# Patient Record
Sex: MALE | Race: WHITE | NOT HISPANIC OR LATINO | URBAN - METROPOLITAN AREA
[De-identification: names, ages, dates, MRNs, and addresses within clinical notes are randomized per-mention and may not be internally consistent; named-entity substitution may affect disease eponyms.]

---

## 2018-06-11 ENCOUNTER — EMERGENCY (EMERGENCY)
Facility: HOSPITAL | Age: 28
LOS: 1 days | Discharge: HOME | End: 2018-06-11
Attending: EMERGENCY MEDICINE
Payer: COMMERCIAL

## 2018-06-11 VITALS
DIASTOLIC BLOOD PRESSURE: 71 MMHG | RESPIRATION RATE: 16 BRPM | HEART RATE: 88 BPM | WEIGHT: 229.94 LBS | OXYGEN SATURATION: 100 % | TEMPERATURE: 98 F | SYSTOLIC BLOOD PRESSURE: 129 MMHG

## 2018-06-11 PROCEDURE — 99283 EMERGENCY DEPT VISIT LOW MDM: CPT

## 2018-06-11 RX ORDER — POLYMYXIN B SULF/TRIMETHOPRIM 10000-1/ML
1 DROPS OPHTHALMIC (EYE) ONCE
Qty: 0 | Refills: 0 | Status: COMPLETED | OUTPATIENT
Start: 2018-06-11 | End: 2018-06-11

## 2018-06-11 RX ORDER — POLYMYXIN B SULF/TRIMETHOPRIM 10000-1/ML
1 DROPS OPHTHALMIC (EYE)
Qty: 1 | Refills: 0 | OUTPATIENT
Start: 2018-06-11 | End: 2018-06-15

## 2018-06-11 RX ADMIN — Medication 1 DROP(S): at 23:36

## 2018-06-11 NOTE — ED PROVIDER NOTE - ATTENDING CONTRIBUTION TO CARE
28y M no signif PMH here with c/o R eye pain, redness beginning today after playing golf outdoors all day. States +FB sensation and burning pain, sensitivity to light, frequent tearing. Denies vision change.   Gen: WNWD NAD  HEENT: NCAT PERRL EOMI R eye conjunctival injection and tearing, no foreign body appreciated on lid eversion, no purulent drainage  Neuro: A&Ox3, CN grossly intact, sensation intact, moves all extremities   AP: 28y M no signif PMH here with c/o R eye pain, redness. Throughly inspected for FB with tetracaine anesthesia, however system wide shortage of fluorescein and unable to stain for corneal abrasion. Will tx as corneal abrasion given sx. Advise FU with optho in next 24-48 hours.

## 2018-06-11 NOTE — ED PROVIDER NOTE - MEDICAL DECISION MAKING DETAILS
Right eye FB sensation, improved after tetracaine drop. unable to do fluorescein stain. Will treat for corneal abrasion and d/c home with opthalmology follow up. Right eye FB sensation, improved after tetracaine drop. unable to do fluorescein stain due to system wide shortage. Will treat for corneal abrasion and d/c home with opthalmology follow up.  Kerri: See attending statement below

## 2018-06-11 NOTE — ED ADULT NURSE NOTE - OBJECTIVE STATEMENT
27y/o male walked into ED a&ox3 c/o eye redness. States redness started after golfing today, felt like something got into his right eye. Has some pain to right eye. Denies itching, blurry or double vision, HA or any other symptoms. Right eye appears red, no drainage. MD at bedside.

## 2018-06-11 NOTE — ED PROVIDER NOTE - OBJECTIVE STATEMENT
28 YOM p/w eye redness after golfing pt states he felt like something got into his right eye occurred 6 hours ago. Pt endorses redness, mild pain, no blurry vision or vision changes. Pt denies chest pain, denies facial pain.

## 2018-06-11 NOTE — ED PROVIDER NOTE - EYES, MLM
Clear bilaterally, pupils equal, round and reactive to light. EOMI. slight injection of right eye. No cells or flare in right eye on slit lamp. due to fluorescein shortage unable to stain.

## 2022-05-13 NOTE — ED ADULT NURSE NOTE - NS ED NURSE RECORD ANOTHER HT AND WT
Writer called and spoke with patients mother Fatuma in regard to patients test results.  Patient's mother verbalized understanding.  No further questions at this time.   Yes

## 2022-11-25 ENCOUNTER — EMERGENCY (EMERGENCY)
Facility: HOSPITAL | Age: 32
LOS: 1 days | Discharge: HOME | End: 2022-11-25
Attending: EMERGENCY MEDICINE | Admitting: EMERGENCY MEDICINE
Payer: COMMERCIAL

## 2022-11-25 VITALS
SYSTOLIC BLOOD PRESSURE: 130 MMHG | WEIGHT: 250 LBS | TEMPERATURE: 99 F | DIASTOLIC BLOOD PRESSURE: 88 MMHG | HEIGHT: 72 IN | RESPIRATION RATE: 16 BRPM | OXYGEN SATURATION: 97 % | HEART RATE: 92 BPM

## 2022-11-25 PROCEDURE — 29505 APPLICATION LONG LEG SPLINT: CPT

## 2022-11-25 PROCEDURE — 99283 EMERGENCY DEPT VISIT LOW MDM: CPT | Mod: 25

## 2022-11-25 PROCEDURE — 73610 X-RAY EXAM OF ANKLE: CPT | Mod: 26,RT

## 2022-11-25 PROCEDURE — 73610 X-RAY EXAM OF ANKLE: CPT

## 2022-11-25 PROCEDURE — 29515 APPLICATION SHORT LEG SPLINT: CPT | Mod: RT

## 2022-11-25 RX ORDER — IBUPROFEN 200 MG
600 TABLET ORAL ONCE
Refills: 0 | Status: COMPLETED | OUTPATIENT
Start: 2022-11-25 | End: 2022-11-25

## 2022-11-25 RX ADMIN — Medication 600 MILLIGRAM(S): at 14:25

## 2022-11-25 NOTE — ED PROVIDER NOTE - PATIENT PORTAL LINK FT
You can access the FollowMyHealth Patient Portal offered by [facility] by registering at the following website: [email or web address]. By joining [employer]’s FollowMyHealth portal, you will also be able to view your health information using other applications (apps) compatible with our system.

## 2022-11-25 NOTE — ED ADULT NURSE NOTE - OBJECTIVE STATEMENT
" I rolled my right ankle playing soccer today, a lot of pain "    patient denies loc, injured the same ankle long time ago, pulse +, skin is warm to touch and intact., will continue to monitor.

## 2022-11-25 NOTE — ED PROVIDER NOTE - CLINICAL SUMMARY MEDICAL DECISION MAKING FREE TEXT BOX
Patient complaining of right ankle pain and swelling status post twisted while playing soccer.  Patient fell to ground but denies any other injury.  No head injury LOC headache neck pain back pain arm pain chest pain short of breath abdominal pain weakness numbness or any other injury.  Plan x-ray Motrin differential including but not limited to fracture sprain

## 2022-11-25 NOTE — ED PROVIDER NOTE - CARE PROVIDER_API CALL
Bhargav Walsh)  Orthopaedic Surgery  [address] [place] [address]  Phone: [phone]  Fax: [phone]  Follow Up Time: 1-3 Days

## 2024-06-12 NOTE — ED PROVIDER NOTE - PRO INTERPRETER NEED 2
Arrives to [facility] for the evaluation of sinus congestion and pressure, productive cough, chest congestion and wheezing.  Symptoms started 1 week ago and thought was allergies.  Has been taking OTC allergy meds and then started to take Mucinex.  Afebrile.  Respirations unlabored.  Reports coughing up thick, greenish/brown phlegm.  Not actively coughing at this time.  Waiting provider to assess.    English

## 2024-11-07 ENCOUNTER — APPOINTMENT (OUTPATIENT)
Dept: PULMONOLOGY | Facility: CLINIC | Age: 34
End: 2024-11-07
Payer: COMMERCIAL

## 2024-11-07 VITALS
HEIGHT: 73 IN | OXYGEN SATURATION: 97 % | SYSTOLIC BLOOD PRESSURE: 117 MMHG | DIASTOLIC BLOOD PRESSURE: 74 MMHG | BODY MASS INDEX: 38.04 KG/M2 | TEMPERATURE: 96 F | WEIGHT: 287 LBS | HEART RATE: 98 BPM

## 2024-11-07 DIAGNOSIS — E66.812 OBESITY, CLASS 2: ICD-10-CM

## 2024-11-07 DIAGNOSIS — G47.33 OBSTRUCTIVE SLEEP APNEA (ADULT) (PEDIATRIC): ICD-10-CM

## 2024-11-07 PROCEDURE — 99204 OFFICE O/P NEW MOD 45 MIN: CPT

## 2024-11-09 ENCOUNTER — TRANSCRIPTION ENCOUNTER (OUTPATIENT)
Age: 34
End: 2024-11-09

## 2024-11-18 ENCOUNTER — TRANSCRIPTION ENCOUNTER (OUTPATIENT)
Age: 34
End: 2024-11-18